# Patient Record
Sex: FEMALE | Race: WHITE | NOT HISPANIC OR LATINO
[De-identification: names, ages, dates, MRNs, and addresses within clinical notes are randomized per-mention and may not be internally consistent; named-entity substitution may affect disease eponyms.]

---

## 2018-06-07 ENCOUNTER — RESULT REVIEW (OUTPATIENT)
Age: 53
End: 2018-06-07

## 2019-06-20 ENCOUNTER — RESULT REVIEW (OUTPATIENT)
Age: 54
End: 2019-06-20

## 2019-07-30 PROBLEM — Z00.00 ENCOUNTER FOR PREVENTIVE HEALTH EXAMINATION: Status: ACTIVE | Noted: 2019-07-30

## 2019-08-06 ENCOUNTER — APPOINTMENT (OUTPATIENT)
Dept: PLASTIC SURGERY | Facility: CLINIC | Age: 54
End: 2019-08-06
Payer: COMMERCIAL

## 2019-08-06 VITALS
OXYGEN SATURATION: 100 % | TEMPERATURE: 98.1 F | HEIGHT: 60 IN | RESPIRATION RATE: 20 BRPM | HEART RATE: 56 BPM | WEIGHT: 136 LBS | SYSTOLIC BLOOD PRESSURE: 124 MMHG | BODY MASS INDEX: 26.7 KG/M2 | DIASTOLIC BLOOD PRESSURE: 73 MMHG

## 2019-08-06 DIAGNOSIS — Z87.891 PERSONAL HISTORY OF NICOTINE DEPENDENCE: ICD-10-CM

## 2019-08-06 DIAGNOSIS — Z87.19 PERSONAL HISTORY OF OTHER DISEASES OF THE DIGESTIVE SYSTEM: ICD-10-CM

## 2019-08-06 PROCEDURE — 99203 OFFICE O/P NEW LOW 30 MIN: CPT

## 2019-08-06 NOTE — HISTORY OF PRESENT ILLNESS
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 DANO GUTIERRES  is a   54 year  old female complaining of large hypertrophic breasts, causing neck, back and shoulder strain.  She also complains of headache and fatigue from constantly trying to support her spine.  Her symptoms began shortly after puberty and remain a constant source  of discomfort and social embarrassment. Her symptoms are not relieved by postural changes weight reduction or supportive measures.  She has been under the care of other practitioners for relief which is ineffectual. The patient is a D right and C cup left  and desires reduction to B cup.   The risks, benefits, alternatives limitations and the permanent scars were outlined with the patient and she is prepared for a pedicled  breast reduction with lollipop  scar.\par

## 2019-08-06 NOTE — PHYSICAL EXAM
[NI] : Normal [de-identified] : sn n 28 cm r 27 cm  l smaller by 100 gm gr 3 ptosis nl sens no mass  [de-identified] : umbilical hernia

## 2019-08-06 NOTE — REVIEW OF SYSTEMS
[Lower Back Pain] : lower back pain [Mid Back Pain] : mid back pain [Upper Back Pain] : upper back pain [FreeTextEntry4] : rash, asymmetry  [Breast Itching] : breast itching

## 2019-08-06 NOTE — ASSESSMENT
[FreeTextEntry1] : Ms. DANO GUTIERRES  is a suitable candidate for a pedicled breast reduction.  Planning medial  pedicle with lollipop scar. unfortunately this is not a candidate for insurance as I expect 300 gm from larger side and 200 gm from smaller side which is a d cup\par

## 2020-06-22 ENCOUNTER — RESULT REVIEW (OUTPATIENT)
Age: 55
End: 2020-06-22

## 2021-06-24 ENCOUNTER — RESULT REVIEW (OUTPATIENT)
Age: 56
End: 2021-06-24

## 2022-07-07 ENCOUNTER — RESULT REVIEW (OUTPATIENT)
Age: 57
End: 2022-07-07

## 2023-01-10 ENCOUNTER — NON-APPOINTMENT (OUTPATIENT)
Age: 58
End: 2023-01-10

## 2023-01-11 ENCOUNTER — APPOINTMENT (OUTPATIENT)
Dept: PLASTIC SURGERY | Facility: CLINIC | Age: 58
End: 2023-01-11
Payer: COMMERCIAL

## 2023-01-11 VITALS
RESPIRATION RATE: 20 BRPM | WEIGHT: 14 LBS | HEART RATE: 77 BPM | OXYGEN SATURATION: 100 % | HEIGHT: 60 IN | BODY MASS INDEX: 2.75 KG/M2 | SYSTOLIC BLOOD PRESSURE: 151 MMHG | TEMPERATURE: 97.8 F | DIASTOLIC BLOOD PRESSURE: 81 MMHG

## 2023-01-11 DIAGNOSIS — Z78.9 OTHER SPECIFIED HEALTH STATUS: ICD-10-CM

## 2023-01-11 PROCEDURE — 99203 OFFICE O/P NEW LOW 30 MIN: CPT

## 2023-01-11 NOTE — PHYSICAL EXAM
[NI] : Normal [de-identified] : Bilateral macromastia with grade 3 ptosis and right breast larger than the left\par Sternal notch to nipple 27.5 cm on the left side and 28.5 cm on the right side \par No masses and no adenopathy \par no palpable masses and no adenopathy

## 2023-01-11 NOTE — HISTORY OF PRESENT ILLNESS
[FreeTextEntry1] : Generally good health \par D cup bra, looking for reduction to B cup range\par non smoker\par no history parenchymal breast disease and no family history of same

## 2023-01-11 NOTE — ASSESSMENT
[FreeTextEntry1] : A:\par Symptomatic macromastia\par P:\par Patient with benefit functionally from bilateral reduction ammmaplasty.  Reviewed the material risks,  benefits,  and alternatives with Ms. DANO GUTIERRES  , including no surgery, and she  understands and wishes to proceed.\par   Anticipate 400 GM reduction

## 2023-01-11 NOTE — REASON FOR VISIT
[Consultation] : a consultation visit [FreeTextEntry1] : 57 year-old woman with back, neck and shoulder pain due to the large size of her breasts

## 2023-01-25 ENCOUNTER — TRANSCRIPTION ENCOUNTER (OUTPATIENT)
Age: 58
End: 2023-01-25

## 2023-02-07 ENCOUNTER — RESULT REVIEW (OUTPATIENT)
Age: 58
End: 2023-02-07

## 2023-02-07 ENCOUNTER — TRANSCRIPTION ENCOUNTER (OUTPATIENT)
Age: 58
End: 2023-02-07

## 2023-02-07 ENCOUNTER — APPOINTMENT (OUTPATIENT)
Dept: PLASTIC SURGERY | Facility: HOSPITAL | Age: 58
End: 2023-02-07
Payer: COMMERCIAL

## 2023-02-07 PROCEDURE — 19318 BREAST REDUCTION: CPT | Mod: 50

## 2023-02-08 ENCOUNTER — TRANSCRIPTION ENCOUNTER (OUTPATIENT)
Age: 58
End: 2023-02-08

## 2023-02-15 ENCOUNTER — APPOINTMENT (OUTPATIENT)
Dept: PLASTIC SURGERY | Facility: CLINIC | Age: 58
End: 2023-02-15
Payer: COMMERCIAL

## 2023-02-15 VITALS
SYSTOLIC BLOOD PRESSURE: 136 MMHG | OXYGEN SATURATION: 99 % | TEMPERATURE: 97.3 F | DIASTOLIC BLOOD PRESSURE: 66 MMHG | HEART RATE: 62 BPM | RESPIRATION RATE: 20 BRPM

## 2023-02-15 PROCEDURE — 99024 POSTOP FOLLOW-UP VISIT: CPT

## 2023-02-15 NOTE — REASON FOR VISIT
[Post Op: _________] : a [unfilled] post op visit [FreeTextEntry1] : Ms. DANO GUTIERRES returns for a follow up visit, generally doing well with no complaints and no fevers or chills at home, generally pleased with the results of her surgery

## 2023-02-15 NOTE — PHYSICAL EXAM
[NI] : Normal [de-identified] : Shape and contour are good\par incisions are clean and intact\par flaps viable no collection

## 2023-03-08 ENCOUNTER — APPOINTMENT (OUTPATIENT)
Dept: PLASTIC SURGERY | Facility: CLINIC | Age: 58
End: 2023-03-08
Payer: COMMERCIAL

## 2023-03-08 VITALS
RESPIRATION RATE: 20 BRPM | DIASTOLIC BLOOD PRESSURE: 73 MMHG | SYSTOLIC BLOOD PRESSURE: 114 MMHG | OXYGEN SATURATION: 99 % | HEART RATE: 64 BPM

## 2023-03-08 PROCEDURE — 99024 POSTOP FOLLOW-UP VISIT: CPT

## 2023-03-08 NOTE — PHYSICAL EXAM
[NI] : Normal [de-identified] : Shape and contour are good,\par incisions are clean and intact\par flaps viable no collection

## 2023-03-23 ENCOUNTER — APPOINTMENT (OUTPATIENT)
Dept: PLASTIC SURGERY | Facility: CLINIC | Age: 58
End: 2023-03-23
Payer: COMMERCIAL

## 2023-03-23 VITALS
HEART RATE: 78 BPM | DIASTOLIC BLOOD PRESSURE: 82 MMHG | TEMPERATURE: 97.9 F | OXYGEN SATURATION: 95 % | SYSTOLIC BLOOD PRESSURE: 132 MMHG | RESPIRATION RATE: 22 BRPM

## 2023-03-23 DIAGNOSIS — N62 HYPERTROPHY OF BREAST: ICD-10-CM

## 2023-03-23 PROCEDURE — 99024 POSTOP FOLLOW-UP VISIT: CPT

## 2023-03-23 NOTE — PHYSICAL EXAM
[NI] : Normal [de-identified] : SHape and contour are good\par incisions are clean and intact \par lateral edema present but improved...

## 2023-03-23 NOTE — REASON FOR VISIT
[Follow-Up: _____] : a [unfilled] follow-up visit [FreeTextEntry1] : Ms. DANO GUTIERRES returns for a follow up visit, generally doing well with no complaints and no fevers or chills at home, generally pleased with the results of her surgery

## 2023-08-24 ENCOUNTER — APPOINTMENT (OUTPATIENT)
Dept: HEART AND VASCULAR | Facility: CLINIC | Age: 58
End: 2023-08-24
Payer: COMMERCIAL

## 2023-08-24 ENCOUNTER — NON-APPOINTMENT (OUTPATIENT)
Age: 58
End: 2023-08-24

## 2023-08-24 VITALS
WEIGHT: 142.31 LBS | HEIGHT: 60 IN | BODY MASS INDEX: 27.94 KG/M2 | OXYGEN SATURATION: 96 % | SYSTOLIC BLOOD PRESSURE: 120 MMHG | HEART RATE: 66 BPM | DIASTOLIC BLOOD PRESSURE: 64 MMHG

## 2023-08-24 DIAGNOSIS — Z80.0 FAMILY HISTORY OF MALIGNANT NEOPLASM OF DIGESTIVE ORGANS: ICD-10-CM

## 2023-08-24 DIAGNOSIS — Z86.39 PERSONAL HISTORY OF OTHER ENDOCRINE, NUTRITIONAL AND METABOLIC DISEASE: ICD-10-CM

## 2023-08-24 DIAGNOSIS — R93.1 ABNORMAL FINDINGS ON DIAGNOSTIC IMAGING OF HEART AND CORONARY CIRCULATION: ICD-10-CM

## 2023-08-24 PROCEDURE — 99204 OFFICE O/P NEW MOD 45 MIN: CPT | Mod: 25

## 2023-08-24 PROCEDURE — 93000 ELECTROCARDIOGRAM COMPLETE: CPT

## 2023-08-24 NOTE — HISTORY OF PRESENT ILLNESS
[FreeTextEntry1] : 58 F here for eval of hyperlipidemia, + coronary calcium score.     Exercises rigorously, no limitations.  No CP or SOB.  No dizziness.  No syncope.   Diet is pescetarian.  Notes she has some mild weight gain.    Minimal smoking hx - quit as teenager Social etoh use - weekend ~ 2 glasses of wine Fhx: HLD Mother and Grandfather

## 2023-08-24 NOTE — ASSESSMENT
[FreeTextEntry1] : 58 F here for eval of hyperlipidemia, + coronary calcium score.    HLD - may be a familial component given Mother/Grandfather with HLD and her significantly elevated LDL without major risk factors.   Coronary Plaque - LAD territory, calcium score 17   - discussed starting statins, patient hesitant, discussed benefits, potential side effects at length.  She is agreeable, will start low dose crestor 5mg.  Repeat lipids and CMP in 3 months - ECHO and EST given CAD, eval for obstructive plaque (lower suspicion given she is very active without symptoms) - counseled on continued efforts at weight loss, maintaining healthy diet  - return in 3 months

## 2023-11-10 ENCOUNTER — LABORATORY RESULT (OUTPATIENT)
Age: 58
End: 2023-11-10

## 2023-11-10 ENCOUNTER — APPOINTMENT (OUTPATIENT)
Dept: CARDIOLOGY | Facility: CLINIC | Age: 58
End: 2023-11-10
Payer: COMMERCIAL

## 2023-11-10 PROCEDURE — 93015 CV STRESS TEST SUPVJ I&R: CPT

## 2023-11-10 PROCEDURE — 93306 TTE W/DOPPLER COMPLETE: CPT

## 2023-11-10 PROCEDURE — 36415 COLL VENOUS BLD VENIPUNCTURE: CPT

## 2023-11-10 RX ORDER — BLACK COHOSH ROOT EXTRACT 80 MG
CAPSULE ORAL
Refills: 0 | Status: ACTIVE | COMMUNITY
Start: 2023-11-10

## 2023-11-10 RX ORDER — MELATONIN 200 MCG
3 TABLET ORAL
Refills: 0 | Status: ACTIVE | COMMUNITY

## 2023-11-10 RX ORDER — CHOLECALCIFEROL (VITAMIN D3) 50 MCG
2000 CAPSULE ORAL
Refills: 0 | Status: ACTIVE | COMMUNITY

## 2023-11-14 ENCOUNTER — NON-APPOINTMENT (OUTPATIENT)
Age: 58
End: 2023-11-14

## 2023-11-14 ENCOUNTER — APPOINTMENT (OUTPATIENT)
Dept: HEART AND VASCULAR | Facility: CLINIC | Age: 58
End: 2023-11-14
Payer: COMMERCIAL

## 2023-11-14 VITALS — OXYGEN SATURATION: 97 % | DIASTOLIC BLOOD PRESSURE: 56 MMHG | HEART RATE: 63 BPM | SYSTOLIC BLOOD PRESSURE: 124 MMHG

## 2023-11-14 DIAGNOSIS — I25.10 ATHEROSCLEROTIC HEART DISEASE OF NATIVE CORONARY ARTERY W/OUT ANGINA PECTORIS: ICD-10-CM

## 2023-11-14 DIAGNOSIS — E78.49 OTHER HYPERLIPIDEMIA: ICD-10-CM

## 2023-11-14 PROCEDURE — 99213 OFFICE O/P EST LOW 20 MIN: CPT | Mod: 25

## 2023-11-14 PROCEDURE — 93000 ELECTROCARDIOGRAM COMPLETE: CPT

## 2024-01-29 RX ORDER — ROSUVASTATIN CALCIUM 5 MG/1
5 TABLET, FILM COATED ORAL
Qty: 90 | Refills: 3 | Status: ACTIVE | COMMUNITY
Start: 2023-08-24 | End: 1900-01-01

## 2024-10-25 ENCOUNTER — RX RENEWAL (OUTPATIENT)
Age: 59
End: 2024-10-25

## 2024-11-12 ENCOUNTER — APPOINTMENT (OUTPATIENT)
Dept: HEART AND VASCULAR | Facility: CLINIC | Age: 59
End: 2024-11-12
Payer: COMMERCIAL

## 2024-11-12 ENCOUNTER — NON-APPOINTMENT (OUTPATIENT)
Age: 59
End: 2024-11-12

## 2024-11-12 VITALS
WEIGHT: 146 LBS | HEART RATE: 67 BPM | SYSTOLIC BLOOD PRESSURE: 118 MMHG | DIASTOLIC BLOOD PRESSURE: 65 MMHG | HEIGHT: 60 IN | OXYGEN SATURATION: 97 % | BODY MASS INDEX: 28.66 KG/M2

## 2024-11-12 DIAGNOSIS — E78.49 OTHER HYPERLIPIDEMIA: ICD-10-CM

## 2024-11-12 DIAGNOSIS — I25.10 ATHEROSCLEROTIC HEART DISEASE OF NATIVE CORONARY ARTERY W/OUT ANGINA PECTORIS: ICD-10-CM

## 2024-11-12 PROCEDURE — G2211 COMPLEX E/M VISIT ADD ON: CPT | Mod: NC

## 2024-11-12 PROCEDURE — 93000 ELECTROCARDIOGRAM COMPLETE: CPT

## 2024-11-12 PROCEDURE — 99214 OFFICE O/P EST MOD 30 MIN: CPT

## 2024-11-21 ENCOUNTER — APPOINTMENT (OUTPATIENT)
Dept: HEART AND VASCULAR | Facility: CLINIC | Age: 59
End: 2024-11-21